# Patient Record
Sex: MALE | Race: WHITE | NOT HISPANIC OR LATINO | Employment: FULL TIME | URBAN - NONMETROPOLITAN AREA
[De-identification: names, ages, dates, MRNs, and addresses within clinical notes are randomized per-mention and may not be internally consistent; named-entity substitution may affect disease eponyms.]

---

## 2024-06-07 ENCOUNTER — OFFICE VISIT (OUTPATIENT)
Dept: URGENT CARE | Facility: PHYSICIAN GROUP | Age: 34
End: 2024-06-07
Payer: COMMERCIAL

## 2024-06-07 ENCOUNTER — APPOINTMENT (OUTPATIENT)
Dept: RADIOLOGY | Facility: IMAGING CENTER | Age: 34
End: 2024-06-07
Attending: FAMILY MEDICINE
Payer: COMMERCIAL

## 2024-06-07 VITALS
WEIGHT: 219 LBS | DIASTOLIC BLOOD PRESSURE: 74 MMHG | BODY MASS INDEX: 30.66 KG/M2 | SYSTOLIC BLOOD PRESSURE: 122 MMHG | HEIGHT: 71 IN | HEART RATE: 93 BPM | RESPIRATION RATE: 16 BRPM | TEMPERATURE: 97.9 F | OXYGEN SATURATION: 98 %

## 2024-06-07 DIAGNOSIS — S80.12XA CONTUSION OF LEFT TIBIA: ICD-10-CM

## 2024-06-07 PROCEDURE — 3078F DIAST BP <80 MM HG: CPT | Performed by: FAMILY MEDICINE

## 2024-06-07 PROCEDURE — 3074F SYST BP LT 130 MM HG: CPT | Performed by: FAMILY MEDICINE

## 2024-06-07 PROCEDURE — 73562 X-RAY EXAM OF KNEE 3: CPT | Mod: TC,FY,LT | Performed by: FAMILY MEDICINE

## 2024-06-07 PROCEDURE — 99203 OFFICE O/P NEW LOW 30 MIN: CPT | Performed by: FAMILY MEDICINE

## 2024-06-08 NOTE — PROGRESS NOTES
"Subjective:      Chief Complaint   Patient presents with    Knee Injury     (L) x 5:30 am pt. Fell while chasing his Chickens.               Knee Pain       Tripped and fell, landed on left knee.       Has bruising over shin, just distal to knee    + pain with wtbearing.                   Social History     Tobacco Use    Smoking status: Every Day    Tobacco comments:     1 pack per day   Substance Use Topics    Alcohol use: Yes     Comment: occ    Drug use: No         No past medical history on file.      No current outpatient medications on file prior to visit.     No current facility-administered medications on file prior to visit.               Review of Systems   Constitutional: Negative for fever and malaise/fatigue.   Respiratory: Negative for shortness of breath.    Cardiovascular: Negative for chest pain.   Neurological: Negative for tingling.   All other systems reviewed and are negative.         Objective:     /74   Pulse 93   Temp 36.6 °C (97.9 °F) (Temporal)   Resp 16   Ht 1.803 m (5' 11\")   Wt 99.3 kg (219 lb)   SpO2 98%     Physical Exam   Constitutional: patient is oriented to person, place, and time.  Patient appears well-developed and well-nourished. No distress.   HENT:   Head: Normocephalic and atraumatic.   Eyes: Conjunctivae are normal.   Cardiovascular: Normal rate.    Pulmonary/Chest: Effort normal.   Neurological: She is alert and oriented to person, place, and time.   musculoskeletal -    Left Knee -  full AROM.    No TTP    There is no swelling.   There is no erythema.  There is no crepitus.  Varus and valgus stress tests negative. Lachman, anterior/posterior drawer test negative  Strength: Flexion 5/5, extension 5/5   Left tibia:   there is a superficial abrasions and bruising,  TTP over proximal tibia.     Skin: Skin is warm. Patient is not diaphoretic. No erythema.   Nursing note and vitals reviewed.        Result Notes  Details    Reading Physician Reading Date Result " Priority   Harry Mcallister M.D.  [phone] 6/7/2024      Narrative & Impression     6/7/2024 5:58 PM     HISTORY/REASON FOR EXAM:  Pain/Deformity Following Trauma        TECHNIQUE/EXAM DESCRIPTION AND NUMBER OF VIEWS:  3 views of the LEFT knee.     COMPARISON: None     FINDINGS:  No acute fracture or dislocation.     No joint osteoarthritis     No knee joint effusion.     IMPRESSION:        1. No acute osseous abnormality.           Exam Ended: 06/07/24  6:04 PM Last Resulted: 06/07/24  6:06 PM             Assessment/Plan:     1. Contusion of left tibia           X-rays were personally reviewed by myself.   There is no fracture or arthritic changes   noted.      rx motrin 800mg tid prn      Differential diagnosis, natural history, supportive care, and indications for immediate follow-up discussed. All questions answered. Patient agrees with the plan of care.     Follow-up as needed if symptoms worsen or fail to improve to PCP, Urgent care or Emergency Room.     I have personally reviewed prior external notes and test results pertinent to today's visit.  I have independently reviewed and interpreted all diagnostics ordered during this urgent care acute visit.